# Patient Record
Sex: MALE | Race: BLACK OR AFRICAN AMERICAN | Employment: FULL TIME | URBAN - METROPOLITAN AREA
[De-identification: names, ages, dates, MRNs, and addresses within clinical notes are randomized per-mention and may not be internally consistent; named-entity substitution may affect disease eponyms.]

---

## 2017-10-20 ENCOUNTER — OFFICE VISIT (OUTPATIENT)
Dept: SURGERY | Age: 57
End: 2017-10-20

## 2017-10-20 VITALS
BODY MASS INDEX: 26.05 KG/M2 | HEART RATE: 58 BPM | WEIGHT: 182 LBS | OXYGEN SATURATION: 98 % | SYSTOLIC BLOOD PRESSURE: 125 MMHG | TEMPERATURE: 98 F | DIASTOLIC BLOOD PRESSURE: 72 MMHG | HEIGHT: 70 IN | RESPIRATION RATE: 14 BRPM

## 2017-10-20 DIAGNOSIS — L72.3 SEBACEOUS CYST: ICD-10-CM

## 2017-10-20 NOTE — PROGRESS NOTES
Surgery History and Physical    Subjective:      Avery Wheat is a 61 y.o. black male who presents for evaluation of cysts. Mr. Kandice Fan has had cysts on his back for years. He has busted one, and it has disappeared. He now has a new one. He denies any pain, redness, or drainage. He has not had any previous excisions. He has no personal or family h/o malignant soft tissue tumors. He denies any other lumps or unusual weight loss. No past medical history on file. No past surgical history on file. Family History   Problem Relation Age of Onset    Diabetes Brother     Hypertension Brother      Social History   Substance Use Topics    Smoking status: Never Smoker    Smokeless tobacco: Never Used    Alcohol use Not on file      Prior to Admission medications    Not on File      Not on File    Review of Systems:  A comprehensive review of systems was negative except for that written in the History of Present Illness. Objective:      Physical Exam:  GENERAL: alert, cooperative, no distress, appears stated age, EYE: negative findings: anicteric sclera, LYMPHATIC: Cervical, supraclavicular nodes normal. , THROAT & NECK: neck supple and symmetrical.  The thyroid is grossly normal., LUNG: clear to auscultation bilaterally, HEART: regular rate and rhythm, ABDOMEN: Soft, NT, ND., EXTREMITIES:  no edema, SKIN: Along the upper back, there is an approximately 2 x 2 cm raised, well-circumscribed, soft, mobile, NT cystic mass. Along the left mid back, there is an approximately 4 x 3 cm raised, firm, mobile, well-circumscribed, NT, cystic mass. There is no erythema or drainage at either site., NEUROLOGIC: negative, PSYCHIATRIC: non focal    Assessment:     Sebaceous cysts of the left upper and left mid back. Plan:     Mr. Kandice Fan would like to have the cysts removed as soon as possible.   I discussed the risks of the procedure including bleeding, infection, wound healing problems, recurrence of the cysts, seroma, and reaction to the prep or local anesthetic. He understands the risks; any and all questions were answered to his satisfaction. Mr. Grace Giron will be scheduled for an elective outpatient excision of these 2 cysts of the back under MAC with local anesthesia.     Signed By: Stephani Boyer MD     October 20, 2017

## 2017-10-20 NOTE — PROGRESS NOTES
1. Have you been to the ER, urgent care clinic since your last visit? Hospitalized since your last visit? [facility]    2. Have you seen or consulted any other health care providers outside of the [address] [place] [address] since your last visit? Include any pap smears or colon screening.  no

## 2017-10-27 ENCOUNTER — ANESTHESIA EVENT (OUTPATIENT)
Dept: SURGERY | Age: 57
End: 2017-10-27
Payer: COMMERCIAL

## 2017-10-30 ENCOUNTER — ANESTHESIA (OUTPATIENT)
Dept: SURGERY | Age: 57
End: 2017-10-30
Payer: COMMERCIAL

## 2017-10-30 ENCOUNTER — HOSPITAL ENCOUNTER (OUTPATIENT)
Age: 57
Setting detail: OUTPATIENT SURGERY
Discharge: HOME | End: 2017-10-30
Attending: SURGERY | Admitting: SURGERY
Payer: COMMERCIAL

## 2017-10-30 VITALS
SYSTOLIC BLOOD PRESSURE: 139 MMHG | WEIGHT: 183.2 LBS | DIASTOLIC BLOOD PRESSURE: 81 MMHG | HEIGHT: 70 IN | RESPIRATION RATE: 14 BRPM | HEART RATE: 67 BPM | OXYGEN SATURATION: 100 % | TEMPERATURE: 97.8 F | BODY MASS INDEX: 26.23 KG/M2

## 2017-10-30 DIAGNOSIS — L72.3 SEBACEOUS CYST: ICD-10-CM

## 2017-10-30 LAB
ATRIAL RATE: 56 BPM
CALCULATED P AXIS, ECG09: 54 DEGREES
CALCULATED R AXIS, ECG10: 11 DEGREES
CALCULATED T AXIS, ECG11: 4 DEGREES
DIAGNOSIS, 93000: NORMAL
P-R INTERVAL, ECG05: 160 MS
Q-T INTERVAL, ECG07: 412 MS
QRS DURATION, ECG06: 82 MS
QTC CALCULATION (BEZET), ECG08: 397 MS
VENTRICULAR RATE, ECG03: 56 BPM

## 2017-10-30 PROCEDURE — 74011250636 HC RX REV CODE- 250/636

## 2017-10-30 PROCEDURE — 77030031139 HC SUT VCRL2 J&J -A: Performed by: SURGERY

## 2017-10-30 PROCEDURE — 74011000250 HC RX REV CODE- 250: Performed by: SURGERY

## 2017-10-30 PROCEDURE — 77030018836 HC SOL IRR NACL ICUM -A: Performed by: SURGERY

## 2017-10-30 PROCEDURE — 77030032490 HC SLV COMPR SCD KNE COVD -B: Performed by: SURGERY

## 2017-10-30 PROCEDURE — 88304 TISSUE EXAM BY PATHOLOGIST: CPT | Performed by: SURGERY

## 2017-10-30 PROCEDURE — 93005 ELECTROCARDIOGRAM TRACING: CPT

## 2017-10-30 PROCEDURE — 76060000032 HC ANESTHESIA 0.5 TO 1 HR: Performed by: SURGERY

## 2017-10-30 PROCEDURE — 77030020782 HC GWN BAIR PAWS FLX 3M -B

## 2017-10-30 PROCEDURE — 77030002916 HC SUT ETHLN J&J -A: Performed by: SURGERY

## 2017-10-30 PROCEDURE — 77030002933 HC SUT MCRYL J&J -A: Performed by: SURGERY

## 2017-10-30 PROCEDURE — 76210000022 HC REC RM PH II 1.5 TO 2 HR: Performed by: SURGERY

## 2017-10-30 PROCEDURE — 76010000138 HC OR TIME 0.5 TO 1 HR: Performed by: SURGERY

## 2017-10-30 PROCEDURE — 74011250636 HC RX REV CODE- 250/636: Performed by: ANESTHESIOLOGY

## 2017-10-30 RX ORDER — SODIUM CHLORIDE 9 MG/ML
50 INJECTION, SOLUTION INTRAVENOUS CONTINUOUS
Status: DISCONTINUED | OUTPATIENT
Start: 2017-10-30 | End: 2017-10-30 | Stop reason: HOSPADM

## 2017-10-30 RX ORDER — LIDOCAINE HYDROCHLORIDE 10 MG/ML
0.1 INJECTION, SOLUTION EPIDURAL; INFILTRATION; INTRACAUDAL; PERINEURAL AS NEEDED
Status: DISCONTINUED | OUTPATIENT
Start: 2017-10-30 | End: 2017-10-30 | Stop reason: HOSPADM

## 2017-10-30 RX ORDER — SODIUM CHLORIDE 0.9 % (FLUSH) 0.9 %
5-10 SYRINGE (ML) INJECTION AS NEEDED
Status: DISCONTINUED | OUTPATIENT
Start: 2017-10-30 | End: 2017-10-30 | Stop reason: HOSPADM

## 2017-10-30 RX ORDER — SODIUM CHLORIDE, SODIUM LACTATE, POTASSIUM CHLORIDE, CALCIUM CHLORIDE 600; 310; 30; 20 MG/100ML; MG/100ML; MG/100ML; MG/100ML
50 INJECTION, SOLUTION INTRAVENOUS CONTINUOUS
Status: DISCONTINUED | OUTPATIENT
Start: 2017-10-30 | End: 2017-10-30 | Stop reason: HOSPADM

## 2017-10-30 RX ORDER — SODIUM CHLORIDE 0.9 % (FLUSH) 0.9 %
5-10 SYRINGE (ML) INJECTION EVERY 8 HOURS
Status: DISCONTINUED | OUTPATIENT
Start: 2017-10-30 | End: 2017-10-30 | Stop reason: HOSPADM

## 2017-10-30 RX ORDER — SODIUM CHLORIDE, SODIUM LACTATE, POTASSIUM CHLORIDE, CALCIUM CHLORIDE 600; 310; 30; 20 MG/100ML; MG/100ML; MG/100ML; MG/100ML
100 INJECTION, SOLUTION INTRAVENOUS CONTINUOUS
Status: DISCONTINUED | OUTPATIENT
Start: 2017-10-30 | End: 2017-10-30 | Stop reason: HOSPADM

## 2017-10-30 RX ORDER — HYDROMORPHONE HYDROCHLORIDE 1 MG/ML
0.5 INJECTION, SOLUTION INTRAMUSCULAR; INTRAVENOUS; SUBCUTANEOUS
Status: DISCONTINUED | OUTPATIENT
Start: 2017-10-30 | End: 2017-10-30 | Stop reason: HOSPADM

## 2017-10-30 RX ORDER — MIDAZOLAM HYDROCHLORIDE 1 MG/ML
INJECTION, SOLUTION INTRAMUSCULAR; INTRAVENOUS AS NEEDED
Status: DISCONTINUED | OUTPATIENT
Start: 2017-10-30 | End: 2017-10-30 | Stop reason: HOSPADM

## 2017-10-30 RX ORDER — FENTANYL CITRATE 50 UG/ML
25 INJECTION, SOLUTION INTRAMUSCULAR; INTRAVENOUS
Status: DISCONTINUED | OUTPATIENT
Start: 2017-10-30 | End: 2017-10-30 | Stop reason: HOSPADM

## 2017-10-30 RX ORDER — BACITRACIN ZINC 500 UNIT/G
OINTMENT (GRAM) TOPICAL AS NEEDED
Status: DISCONTINUED | OUTPATIENT
Start: 2017-10-30 | End: 2017-10-30 | Stop reason: HOSPADM

## 2017-10-30 RX ORDER — KETOROLAC TROMETHAMINE 30 MG/ML
INJECTION, SOLUTION INTRAMUSCULAR; INTRAVENOUS AS NEEDED
Status: DISCONTINUED | OUTPATIENT
Start: 2017-10-30 | End: 2017-10-30 | Stop reason: HOSPADM

## 2017-10-30 RX ORDER — FENTANYL CITRATE 50 UG/ML
INJECTION, SOLUTION INTRAMUSCULAR; INTRAVENOUS AS NEEDED
Status: DISCONTINUED | OUTPATIENT
Start: 2017-10-30 | End: 2017-10-30 | Stop reason: HOSPADM

## 2017-10-30 RX ORDER — HYDROMORPHONE HYDROCHLORIDE 1 MG/ML
.25-1 INJECTION, SOLUTION INTRAMUSCULAR; INTRAVENOUS; SUBCUTANEOUS
Status: DISCONTINUED | OUTPATIENT
Start: 2017-10-30 | End: 2017-10-30 | Stop reason: HOSPADM

## 2017-10-30 RX ORDER — OXYCODONE AND ACETAMINOPHEN 5; 325 MG/1; MG/1
1 TABLET ORAL
Qty: 10 TAB | Refills: 0 | Status: SHIPPED | OUTPATIENT
Start: 2017-10-30

## 2017-10-30 RX ADMIN — MIDAZOLAM HYDROCHLORIDE 1 MG: 1 INJECTION, SOLUTION INTRAMUSCULAR; INTRAVENOUS at 13:35

## 2017-10-30 RX ADMIN — MIDAZOLAM HYDROCHLORIDE 0.5 MG: 1 INJECTION, SOLUTION INTRAMUSCULAR; INTRAVENOUS at 13:29

## 2017-10-30 RX ADMIN — FENTANYL CITRATE 25 MCG: 50 INJECTION, SOLUTION INTRAMUSCULAR; INTRAVENOUS at 13:25

## 2017-10-30 RX ADMIN — MIDAZOLAM HYDROCHLORIDE 1 MG: 1 INJECTION, SOLUTION INTRAMUSCULAR; INTRAVENOUS at 13:07

## 2017-10-30 RX ADMIN — SODIUM CHLORIDE, POTASSIUM CHLORIDE, SODIUM LACTATE AND CALCIUM CHLORIDE 100 ML/HR: 600; 310; 30; 20 INJECTION, SOLUTION INTRAVENOUS at 12:44

## 2017-10-30 RX ADMIN — MIDAZOLAM HYDROCHLORIDE 1 MG: 1 INJECTION, SOLUTION INTRAMUSCULAR; INTRAVENOUS at 13:11

## 2017-10-30 RX ADMIN — MIDAZOLAM HYDROCHLORIDE 1 MG: 1 INJECTION, SOLUTION INTRAMUSCULAR; INTRAVENOUS at 13:16

## 2017-10-30 RX ADMIN — FENTANYL CITRATE 50 MCG: 50 INJECTION, SOLUTION INTRAMUSCULAR; INTRAVENOUS at 13:16

## 2017-10-30 RX ADMIN — MIDAZOLAM HYDROCHLORIDE 0.5 MG: 1 INJECTION, SOLUTION INTRAMUSCULAR; INTRAVENOUS at 13:25

## 2017-10-30 RX ADMIN — FENTANYL CITRATE 25 MCG: 50 INJECTION, SOLUTION INTRAMUSCULAR; INTRAVENOUS at 13:31

## 2017-10-30 RX ADMIN — KETOROLAC TROMETHAMINE 30 MG: 30 INJECTION, SOLUTION INTRAMUSCULAR; INTRAVENOUS at 13:43

## 2017-10-30 NOTE — ANESTHESIA POSTPROCEDURE EVALUATION
Post-Anesthesia Evaluation and Assessment    Patient: Perla Maynard MRN: [id number]  SSN: [id number]    YOB: 1960  Age: 61 y.o. Sex: male       Cardiovascular Function/Vital Signs  Visit Vitals    /81 (BP 1 Location: Left arm, BP Patient Position: At rest)    Pulse 67    Temp 36.6 °C (97.8 °F)    Resp 14    Ht 5' 10\" (1.778 m)    Wt 83.1 kg (183 lb 3.2 oz)    SpO2 100%    BMI 26.29 kg/m2       Patient is status post MAC anesthesia for Procedure(s):  EXCISION OF CYSTS OF LEFT UPPER & LEFT MID BACK . Nausea/Vomiting: None    Postoperative hydration reviewed and adequate. Pain:  Pain Scale 1: Numeric (0 - 10) (10/30/17 1431)  Pain Intensity 1: 0 (10/30/17 1431)   Managed    Neurological Status:   Neuro (WDL): Within Defined Limits (10/30/17 1431)  Neuro  Neurologic State: Alert; Appropriate for age (10/30/17 1431)  Orientation Level: Oriented X4 (10/30/17 1431)  Speech: Clear (10/30/17 1431)  LUE Motor Response: Purposeful;Spontaneous  (10/30/17 1431)  LLE Motor Response: Spontaneous ; Purposeful (10/30/17 1431)  RUE Motor Response: Purposeful;Spontaneous  (10/30/17 1431)  RLE Motor Response: Spontaneous ; Purposeful (10/30/17 1431)   At baseline    Mental Status and Level of Consciousness: Arousable    Pulmonary Status:   O2 Device: Room air (10/30/17 1431)   Adequate oxygenation and airway patent    Complications related to anesthesia: None    Post-anesthesia assessment completed.  No concerns    Signed By: Abelardo Lima MD     October 30, 2017

## 2017-10-30 NOTE — IP AVS SNAPSHOT
Summary of Care Report The Summary of Care report has been created to help improve care coordination. Users with access to [employer] or [address] (Web-based application) may access additional patient information including the Discharge Summary. If you are not currently a [address] user and need more information, please call the number listed below in the [place] [address] section and ask to be connected with Medical Records. Facility Information Name Address Phone [address] [place] [id number] [phone] Patient Information Patient Name Sex   ([id number]) Male 1960 Discharge Information Admitting Provider Service Area Unit Jaydon Butler MD / [phone] [facility] 645 / [phone] Discharge Provider Discharge Date/Time Discharge Disposition Destination (none) 10/30/2017 (Pending) [facility] (none) Patient Language Language ENGLISH [13] Hospital Problems as of 10/30/2017  Reviewed: 10/20/2017 10:04 AM by Jaydon Butler MD  
 None Non-Hospital Problems as of 10/30/2017  Reviewed: 10/20/2017 10:04 AM by Jaydon Butler MD  
  
  
  
 Class Noted - Resolved Last Modified Active Problems Sebaceous cyst  10/20/2017 - Present 10/20/2017 by Jaydon Butler MD  
  Entered by Jaydon Butler MD  
  Overview Signed 10/20/2017 10:04 AM by Jaydon Butler MD  
   Left upper and left mid back. You are allergic to the following No active allergies Current Discharge Medication List  
  
START taking these medications Dose & Instructions Dispensing Information Comments  
 oxyCODONE-acetaminophen 5-325 mg per tablet Commonly known as:  PERCOCET Dose:  1 Tab Take 1 Tab by mouth every four (4) hours as needed for Pain. Max Daily Amount: 6 Tabs. Quantity:  10 Tab Refills:  0 Surgery Information ID Date/Time Status Primary Surgeon All Procedures Location [id number] 10/30/2017 2520 Malissa Becerra MD EXCISION OF CYSTS OF LEFT UPPER & LEFT MID BACK  [facility]IN OR Follow-up Information Follow up With Details Comments Contact Info None   None (395) Patient stated that they have no PCP Discharge Instructions Instructions Following Excision of Cyst, Soft Tissue Mass Activity: · No quick, stretching movement with your back for 2 weeks. · May shower tomorrow. No bath for 2 weeks and until after seen by your doctor. Diet: 
· Advance to regular diet as tolerated. · If nausea and vomiting continues, call your doctor. Pain: · Take pain medication as directed by your doctor. ·  Call your doctor if pain is NOT relieved by medication. · DO NOT take blood thinners until directed by your doctor. Dressing Care:  
Remove your bandage on Wednesday, 11/1. Apply a topical antibiotic ointment daily after cleaning the incisions with a damp wash cloth, then cover the incisions with a dry bandage. Keep the incisions clean and dry. Follow-Up Phone Calls: 
· Call will be made by my nursing staff tomorrow. · If you have any problems or concerns, call your doctor as needed. Call your doctor if you experience: 
· Excessive bleeding that does not stop after holding mild pressure over the area. · Temperature of 101° Fahrenheit or above. · Redness, excessive swelling or bruising, and/or green or yellow, smelly discharge from incision. After Anesthesia: 
· For the first 24 hours and while taking narcotic pain medication: DO NOT drive, drink alcoholic beverages, or make important decisions. · Be aware of dizziness following anesthesia and while taking pain medication. Rere Romero. Kimberly Ernandez MD, FACS General Surgery at [facility] [address], [address] 
[phone] Fax [phone] DISCHARGE SUMMARY from your Nurse The following personal items collected during your admission are returned to you:  
Dental Appliance: Dental Appliances: None Vision: Visual Aid: None Hearing Aid:   
Jewelry: Jewelry: None Clothing: Clothing: Shirt, Belt, Socks, Footwear, Undergarments, Pants, Jacket/Coat Other Valuables: Other Valuables: FIZZA, Cell Phone (given to security ) Valuables sent to safe:   
 
PATIENT INSTRUCTIONS: 
 
After general anesthesia or intravenous sedation, for 24 hours or while taking prescription Narcotics: · Limit your activities · Do not drive and operate hazardous machinery · Do not make important personal or business decisions · Do  not drink alcoholic beverages · If you have not urinated within 8 hours after discharge, please contact your surgeon on call. Report the following to your surgeon: 
· Excessive pain, swelling, redness or odor of or around the surgical area · Temperature over 100.5 · Nausea and vomiting lasting longer than 4 hours or if unable to take medications · Any signs of decreased circulation or nerve impairment to extremity: change in color, persistent  numbness, tingling, coldness or increase pain · Any questions [address] Breathing deep and coughing are very important exercises to do after surgery. Deep breathing and coughing open the little air tubes and air sacks in your lungs. You take deep breaths every day. You may not even notice - it is just something you do when you sigh or yawn. It is a natural exercise you do to keep these air passages open. After surgery, take deep breaths and cough, on purpose.    
 
Coughing and deep breathing help prevent bronchitis and pneumonia after surgery. If you had chest or belly surgery, use a pillow as a \"hug shan\" and hold it tightly to your chest or belly when you cough. DIRECTIONS: 
6. Take 10 to 15 slow deep breaths every hour while awake. 7. Breathe in deeply, and hold it for 2 seconds. 8. Exhale slowly through puckered lips, like blowing up a balloon. 9. After every 4th or 5th deep breath, hug your pillow to your chest or belly and give a hard, deep cough. Yes, it will probably hurt. But doing this exercise is very important part of healing after surgery. Take your pain medicine to help you do this exercise without too much pain. IF YOU HAVE BEEN DIAGNOSED WITH SLEEP APNEA, PLEASE USE YOUR SLEEP APNEA DEVICE OR CPAP MACHINE WHEN YOU INTEND TO NAP AFTER TAKING PAIN MEDICATION. Ankle Pumps Ankle pumps increase the circulation of oxygenated blood to your lower extremities and decrease your risk for circulation problems such as blood clots. They also stretch the muscles, tendons and ligaments in your foot and ankle, and prevent joint contracture in the ankle and foot, especially after surgeries on the legs. It is important to do ankle pump exercises regularly after surgery because immobility increases your risk for developing a blood clot. Your doctor may also have you take an Aspirin for the next few days as well. If your doctor did not ask you to take an Aspirin, consult with him before starting Aspirin therapy on your own. Slowly point your foot forward, feeling the muscles on the top of your lower leg stretch, and hold this position for 5 seconds. Next, pull your foot back toward you as far as possible, stretching the calf muscles, and hold that position for 5 seconds. Repeat with the other foot. Perform 10 repetitions every hour while awake for both ankles if possible (down and then up with the foot once is one repetition). You should feel gentle stretching of the muscles in your lower leg when doing this exercise. If you feel pain, or your range of motion is limited, don't  Push too hard. Only go the limit your joint and muscles will let you go. If you have increasing pain, progressively worsening leg warmth or swelling, STOP the exercise and call your doctor. Below is information about the medications your doctor is prescribing after your visit: 
 
 
· Oxycodone-Acetaminophen 5-325mg Chart Review Routing History No Routing History on File

## 2017-10-30 NOTE — IP AVS SNAPSHOT
Julissa Alvares 
 
 
 [address] [place] [address] 
[phone] Patient: Ruel Angel MRN: [id number] WZA:[birth year]6 About your hospitalization You were admitted on:  October 30, 2017 You last received care in the:  [facility] PACU You were discharged on:  October 30, 2017 Why you were hospitalized Your primary diagnosis was:  Not on File Things You Need To Do (next 8 weeks) Follow up with None Where:  None (395) Patient stated that they have no PCP Monday Nov 13, 2017 POST OP with Larene Kayser., MD at  9:15 AM  
Where: [facility] ([facility]) Discharge Orders None A check eusebio indicates which time of day the medication should be taken. My Medications TAKE these medications as instructed Instructions Each Dose to Equal  
 Morning Noon Evening Bedtime  
 oxyCODONE-acetaminophen 5-325 mg per tablet Commonly known as:  PERCOCET Your last dose was: Your next dose is: Take 1 Tab by mouth every four (4) hours as needed for Pain. Max Daily Amount: 6 Tabs. 1 Tab Where to Get Your Medications Information on where to get these meds will be given to you by the nurse or doctor. ! Ask your nurse or doctor about these medications  
  oxyCODONE-acetaminophen 5-325 mg per tablet Discharge Instructions Instructions Following Excision of Cyst, Soft Tissue Mass Activity: · No quick, stretching movement with your back for 2 weeks. · May shower tomorrow. No bath for 2 weeks and until after seen by your doctor. Diet: 
· Advance to regular diet as tolerated. · If nausea and vomiting continues, call your doctor. Pain: · Take pain medication as directed by your doctor. ·  Call your doctor if pain is NOT relieved by medication. · DO NOT take blood thinners until directed by your doctor. Dressing Care:  
Remove your bandage on Wednesday, 11/1. Apply a topical antibiotic ointment daily after cleaning the incisions with a damp wash cloth, then cover the incisions with a dry bandage. Keep the incisions clean and dry. Follow-Up Phone Calls: 
· Call will be made by my nursing staff tomorrow. · If you have any problems or concerns, call your doctor as needed. Call your doctor if you experience: 
· Excessive bleeding that does not stop after holding mild pressure over the area. · Temperature of 101° Fahrenheit or above. · Redness, excessive swelling or bruising, and/or green or yellow, smelly discharge from incision. After Anesthesia: 
· For the first 24 hours and while taking narcotic pain medication: DO NOT drive, drink alcoholic beverages, or make important decisions. · Be aware of dizziness following anesthesia and while taking pain medication. Yared Myles. Ernst Rogel MD, FACS General Surgery at [address], [address] [facility] 
[phone] Fax [phone] DISCHARGE SUMMARY from your Nurse The following personal items collected during your admission are returned to you:  
Dental Appliance: Dental Appliances: None Vision: Visual Aid: None Hearing Aid:   
Jewelry: Jewelry: None Clothing: Clothing: Shirt, Belt, Socks, Footwear, Undergarments, Pants, Jacket/Coat Other Valuables: Other Valuables: Randy Gaona, Cell Phone (given to security ) Valuables sent to safe:   
 
PATIENT INSTRUCTIONS: 
 
After general anesthesia or intravenous sedation, for 24 hours or while taking prescription Narcotics: · Limit your activities · Do not drive and operate hazardous machinery · Do not make important personal or business decisions · Do  not drink alcoholic beverages · If you have not urinated within 8 hours after discharge, please contact your surgeon on call. Report the following to your surgeon: 
· Excessive pain, swelling, redness or odor of or around the surgical area · Temperature over 100.5 · Nausea and vomiting lasting longer than 4 hours or if unable to take medications · Any signs of decreased circulation or nerve impairment to extremity: change in color, persistent  numbness, tingling, coldness or increase pain · Any questions [address] Breathing deep and coughing are very important exercises to do after surgery. Deep breathing and coughing open the little air tubes and air sacks in your lungs. You take deep breaths every day. You may not even notice - it is just something you do when you sigh or yawn. It is a natural exercise you do to keep these air passages open. After surgery, take deep breaths and cough, on purpose. Coughing and deep breathing help prevent bronchitis and pneumonia after surgery. If you had chest or belly surgery, use a pillow as a \"hug buddy\" and hold it tightly to your chest or belly when you cough. DIRECTIONS: 
6. Take 10 to 15 slow deep breaths every hour while awake. 7. Breathe in deeply, and hold it for 2 seconds. 8. Exhale slowly through puckered lips, like blowing up a balloon. 9. After every 4th or 5th deep breath, hug your pillow to your chest or belly and give a hard, deep cough. Yes, it will probably hurt. But doing this exercise is very important part of healing after surgery. Take your pain medicine to help you do this exercise without too much pain. IF YOU HAVE BEEN DIAGNOSED WITH SLEEP APNEA, PLEASE USE YOUR SLEEP APNEA DEVICE OR CPAP MACHINE WHEN YOU INTEND TO NAP AFTER TAKING PAIN MEDICATION. Ankle Pumps Ankle pumps increase the circulation of oxygenated blood to your lower extremities and decrease your risk for circulation problems such as blood clots.  They also stretch the muscles, tendons and ligaments in your foot and ankle, and prevent joint contracture in the ankle and foot, especially after surgeries on the legs. It is important to do ankle pump exercises regularly after surgery because immobility increases your risk for developing a blood clot. Your doctor may also have you take an Aspirin for the next few days as well. If your doctor did not ask you to take an Aspirin, consult with him before starting Aspirin therapy on your own. Slowly point your foot forward, feeling the muscles on the top of your lower leg stretch, and hold this position for 5 seconds. Next, pull your foot back toward you as far as possible, stretching the calf muscles, and hold that position for 5 seconds. Repeat with the other foot. Perform 10 repetitions every hour while awake for both ankles if possible (down and then up with the foot once is one repetition). You should feel gentle stretching of the muscles in your lower leg when doing this exercise. If you feel pain, or your range of motion is limited, don't  Push too hard. Only go the limit your joint and muscles will let you go. If you have increasing pain, progressively worsening leg warmth or swelling, STOP the exercise and call your doctor. Below is information about the medications your doctor is prescribing after your visit: 
 
 
· Oxycodone-Acetaminophen 5-325mg Introducing [facility] SERVICES! Julia Marco A introduces [employer] patient portal. Now you can access parts of your medical record, email your doctor's office, and request medication refills online. 1. In your internet browser, go to [email or web address] 2. Click on the First Time User? Click Here link in the Sign In box. You will see the New Member Sign Up page. 3. Enter your [employer] Access Code exactly as it appears below. You will not need to use this code after youve completed the sign-up process. If you do not sign up before the expiration date, you must request a new code. · [employer] Access Code: [email or web address] Expires: 1/28/2018 12:06 PM 
 
4. Enter the last four digits of your Social Security Number (xxxx) and Date of Birth (mm/dd/yyyy) as indicated and click Submit. You will be taken to the next sign-up page. 5. Create a [employer] ID. This will be your [employer] login ID and cannot be changed, so think of one that is secure and easy to remember. 6. Create a [employer] password. You can change your password at any time. 7. Enter your Password Reset Question and Answer. This can be used at a later time if you forget your password. 8. Enter your e-mail address. You will receive e-mail notification when new information is available in [address] 9. Click Sign Up. You can now view and download portions of your medical record. 10. Click the Download Summary menu link to download a portable copy of your medical information. If you have questions, please visit the Frequently Asked Questions section of the saperatect website. Remember, [employer] is NOT to be used for urgent needs. For medical emergencies, dial 911. Now available from your iPhone and Android! Providers Seen During Your Hospitalization Provider Specialty Primary office phone Fredi Washington MD Surgery [phone] Your Primary Care Physician (PCP) Primary Care Physician Office Phone Office Fax NONE ** None ** ** None ** You are allergic to the following No active allergies Recent Documentation Height Weight BMI Smoking Status 1.778 m 83.1 kg 26.29 kg/m2 Never Smoker Emergency Contacts Name Discharge Info Relation Home Work Mobile EdvinAzul mcwilliams DISCHARGE CAREGIVER [3] Spouse [3]   [phone] Patient Belongings The following personal items are in your possession at time of discharge: 
  Dental Appliances: None  Visual Aid: None   Hearing Aids/Status: Does not own  Home Medications: None   Jewelry: None  Clothing: Shirt, Belt, Socks, Footwear, Undergarments, Pants, Jacket/Coat    Other Valuables: Wallet, [employer] (given to security ) Please provide this summary of care documentation to your next provider. Signatures-by signing, you are acknowledging that this After Visit Summary has been reviewed with you and you have received a copy. Patient Signature:  ____________________________________________________________ Date:  ____________________________________________________________  
  
Gomez Wheat Provider Signature:  ____________________________________________________________ Date:  ____________________________________________________________

## 2017-10-30 NOTE — OP NOTES
Operative Report        Sofi Ramirez    MRN:  [id number]    Date of Procedure:  10/30/2017    Surgeon:  Bryan Porter MD.     Assistant:   Serina Luevano. Anesthesia:   1. MAC.  2. 1% Xylocaine with Epinephrine and 0.5% Marcaine. Preoperative Diagnosis:   1. CYST OF THE LEFT UPPER BACK . 2. CYST OF THE LEFT MID BACK. Postoperative Diagnosis:    1. CYST OF THE LEFT UPPER BACK . 2. CYST OF THE LEFT MID BACK. Procedures:  1. Excision of cyst of the left upper back. 2. Excision of cyst of the left mid back. Indication:   Sofi Ramirez is a 61 yrs black male who presents with cysts of the back which have been present for years. The cysts are now enlarging and have become inflamed. He would like to have them removed. He now presents for his elective procedure. Procedure in Detail:   The patient was seen preoperatively in the holding area. The risks, benefits, and expected outcome were discussed with the patient, and all questions were answered satisfactorily. The patient concurred with the proposed plan, giving informed consent. The cysts were identified by the patient and confirmed by me. The cysts were then marked. The patient was taken to the OR. The patient was identified as Sofi Ramirez, and the procedure verified as Procedure(s):  EXCISION OF CYSTS OF LEFT UPPER & LEFT MID BACK . The patient was placed on the OR table in the supine position. MAC anesthesia was administered and tolerated well. The patient was flipped to the right lateral decubitus position. The patient's back was prepped with Chlorprep and draped in the usual sterile fashion. A Time Out was performed, and the above information was confirmed. Attention was turned first to the left upper back. Thecyst was palpated and remarked. The local anesthetic was infiltrated into the skin and subcutaneous tissue.   A transverse elliptical incision was made directly over the most prominent portion of the cyst.  Dissection was taken down into the subcutaneous tissue with a #15 blade, and a cyst was discovered. The cyst was dissected out in its entirety with blunt dissection and cautery and passed off as a specimen. No other masses were palpated within the wound. Hemostasis was obtained within the wound as needed with cautery. The wound was irrigated with saline. The subcutaneous tissue was approximated with interrupted 3-0 Vicryl. The skin was closed with 3-0 Nylon in interrupted horizontal mattress fashion. Attention was then turned to the left mid back. Thecyst was palpated and remarked. The local anesthetic was infiltrated into the skin and subcutaneous tissue. A transverse elliptical incision was made directly over the most prominent portion of the cyst.  Dissection was taken down into the subcutaneous tissue with a # 15 blade, and a cyst was discovered. The cyst was dissected out in its entirety with blunt dissection and cautery and passed off as a specimen. No other masses were palpated within the wound. Hemostasis was obtained within the wound as needed with cautery. The wound was irrigated with saline. The subcutaneous tissue was approximated with interrupted 3-0 Vicryl. The skin was closed with 3-0 Nylon in interrupted horizontal mattress fashion. Both wounds were cleaned and dried, and a topical antibiotic ointment and a sterile dressing were applied. Estimated Blood Loss:    Less than 25 ml. Specimen:     ID Type Source Tests Collected by Time Destination   1 : left upper back sebaceous cyst Preservative Cyst  Tisha Carlson MD 10/30/2017 1325 Pathology   2 : left mid-back sebaceous cyst Preservative Cyst  Tisha Carlson MD 10/30/2017 1336 Pathology        Drain:   * No implants in log *    Findings:   1. An approximately 2.5 x 2 cm subcutaneous cyst of the left upper back. 2. An approximately 1.5 x 1 cm subcutaneous cyst of the left mid back. Counts:   All sponge, needle, and instrument counts were correct x 2. Complications:  None. Disposition:  The patient was transferred to the recovery room in stable condition, having tolerated the procedure and anesthesia well.                Signed By: Stephani Boyer MD     October 30, 2017        Hugo Corona

## 2017-10-30 NOTE — DISCHARGE INSTRUCTIONS
Instructions Following Excision of Cyst, Soft Tissue Mass    Activity:  · No quick, stretching movement with your back for 2 weeks. · May shower tomorrow. No bath for 2 weeks and until after seen by your doctor. Diet:  · Advance to regular diet as tolerated. · If nausea and vomiting continues, call your doctor. Pain:  · Take pain medication as directed by your doctor. ·  Call your doctor if pain is NOT relieved by medication. · DO NOT take blood thinners until directed by your doctor. Dressing Care:   Remove your bandage on Wednesday, 11/1. Apply a topical antibiotic ointment daily after cleaning the incisions with a damp wash cloth, then cover the incisions with a dry bandage. Keep the incisions clean and dry. Follow-Up Phone Calls:  · Call will be made by my nursing staff tomorrow. · If you have any problems or concerns, call your doctor as needed. Call your doctor if you experience:  · Excessive bleeding that does not stop after holding mild pressure over the area. · Temperature of 101° Fahrenheit or above. · Redness, excessive swelling or bruising, and/or green or yellow, smelly discharge from incision. After Anesthesia:  · For the first 24 hours and while taking narcotic pain medication: DO NOT drive, drink alcoholic beverages, or make important decisions. · Be aware of dizziness following anesthesia and while taking pain medication. Prasanth Bound. Lorie Ann MD, FACS  General Surgery at [address] [place] [address] Jarrett Munozjandra [address]  [phone]  Fax [phone]    DISCHARGE SUMMARY from your Nurse    The following personal items collected during your admission are returned to you:   Dental Appliance: Dental Appliances: None  Vision: Visual Aid: None  Hearing Aid:    Jewelry: Jewelry: None  Clothing: Clothing: Shirt, [employer], Socks, Footwear, Undergarments, Pants, Jacket/Coat  Other Valuables:  Other Valuables: Wallet, Cell Phone (given to security )  Valuables sent to safe:      PATIENT INSTRUCTIONS:    After general anesthesia or intravenous sedation, for 24 hours or while taking prescription Narcotics:  · Limit your activities  · Do not drive and operate hazardous machinery  · Do not make important personal or business decisions  · Do  not drink alcoholic beverages  · If you have not urinated within 8 hours after discharge, please contact your surgeon on call. Report the following to your surgeon:  · Excessive pain, swelling, redness or odor of or around the surgical area  · Temperature over 100.5  · Nausea and vomiting lasting longer than 4 hours or if unable to take medications  · Any signs of decreased circulation or nerve impairment to extremity: change in color, persistent  numbness, tingling, coldness or increase pain  · Any questions    COUGH AND DEEP BREATHE    Breathing deep and coughing are very important exercises to do after surgery. Deep breathing and coughing open the little air tubes and air sacks in your lungs. You take deep breaths every day. You may not even notice - it is just something you do when you sigh or yawn. It is a natural exercise you do to keep these air passages open. After surgery, take deep breaths and cough, on purpose. Coughing and deep breathing help prevent bronchitis and pneumonia after surgery. If you had chest or belly surgery, use a pillow as a \"hug buddy\" and hold it tightly to your chest or belly when you cough. DIRECTIONS:  6. Take 10 to 15 slow deep breaths every hour while awake. 7. Breathe in deeply, and hold it for 2 seconds. 8. Exhale slowly through puckered lips, like blowing up a balloon. 9. After every 4th or 5th deep breath, hug your pillow to your chest or belly and give a hard, deep cough. Yes, it will probably hurt. But doing this exercise is very important part of healing after surgery.   Take your pain medicine to help you do this exercise without too much pain. IF YOU HAVE BEEN DIAGNOSED WITH SLEEP APNEA, PLEASE USE YOUR SLEEP APNEA DEVICE OR CPAP MACHINE WHEN YOU INTEND TO NAP AFTER TAKING PAIN MEDICATION. Ankle Pumps    Ankle pumps increase the circulation of oxygenated blood to your lower extremities and decrease your risk for circulation problems such as blood clots. They also stretch the muscles, tendons and ligaments in your foot and ankle, and prevent joint contracture in the ankle and foot, especially after surgeries on the legs. It is important to do ankle pump exercises regularly after surgery because immobility increases your risk for developing a blood clot. Your doctor may also have you take an Aspirin for the next few days as well. If your doctor did not ask you to take an Aspirin, consult with him before starting Aspirin therapy on your own. Slowly point your foot forward, feeling the muscles on the top of your lower leg stretch, and hold this position for 5 seconds. Next, pull your foot back toward you as far as possible, stretching the calf muscles, and hold that position for 5 seconds. Repeat with the other foot. Perform 10 repetitions every hour while awake for both ankles if possible (down and then up with the foot once is one repetition). You should feel gentle stretching of the muscles in your lower leg when doing this exercise. If you feel pain, or your range of motion is limited, don't  Push too hard. Only go the limit your joint and muscles will let you go. If you have increasing pain, progressively worsening leg warmth or swelling, STOP the exercise and call your doctor.      Below is information about the medications your doctor is prescribing after your visit:    Other information in your discharge envelope:  []     PRESCRIPTIONS  []     PHYSICAL THERAPY PRESCRIPTION  []     APPOINTMENT CARDS  []     Regional Anesthesia Pamphlet for block or block with On-Q Catheter from Anesthesia Service  []     Medical device information sheets/pamphlets from their    []     School/work excuse note. []     /parent work excuse note. These are general instructions for a healthy lifestyle:    *  Please give a list of your current medications to your Primary Care Provider. *  Please update this list whenever your medications are discontinued, doses are      changed, or new medications (including over-the-counter products) are added. *  Please carry medication information at all times in case of emergency situations. About Smoking  No smoking / No tobacco products / Avoid exposure to second hand smoke    Surgeon General's Warning:  Quitting smoking now greatly reduces serious risk to your health. Obesity, smoking, and sedentary lifestyle greatly increases your risk for illness and disease. A healthy diet, regular physical exercise & weight monitoring are important for maintaining a healthy lifestyle. Congestive Heart Failure  You may be retaining fluid if you have a history of heart failure or if you experience any of the following symptoms:  Weight gain of 3 pounds or more overnight or 5 pounds in a week, increased swelling in our hands or feet or shortness of breath while lying flat in bed. Please call your doctor as soon as you notice any of these symptoms; do not wait until your next office visit. Recognize signs and symptoms of STROKE:  F - face looks uneven  A - arms unable to move or move even  S - speech slurred or non-existent  T - time-call 911 as soon as signs and symptoms begin-DO NOT go         Back to bed or wait to see if you get better-TIME IS BRAIN. Warning signs of HEART ATTACK  Call 911 if you have these symptoms    · Chest discomfort. Most heart attacks involve discomfort in the center of the chest that lasts more than a few minutes, or that goes away and comes back.   It can feel like uncomfortable pressure, squeezing, fullness, or pain. · Discomfort in other areas of the upper body. Symptoms can include pain or discomfort in one or both        Arms, the back, neck, jaw, or stomach. ·  Shortness of breath with or without chest discomfort. · Other signs may include breaking out in a cold sweat, nausea, or lightheadedness    Don't wait more than five minutes to call 911 - MINUTES MATTER! Fast action can save your life. Calling 911 is almost always the fastest way to get lifesaving treatment. Emergency Medical Services staff can begin treatment when they arrive - up to an hour sooner than if someone gets to the hospital by car. [facility] MEDICATION AND SIDE EFFECT GUIDE    The [facility] MEDICATION AND SIDE EFFECT GUIDE was provided to the PATIENT AND CARE PROVIDER.   Information provided includes instruction about drug purpose and common side effects for the following medications:    · Oxycodone-Acetaminophen 5-325mg

## 2017-10-30 NOTE — IP AVS SNAPSHOT
[address] 
[phone] Patient: Michael Louis MRN: [id number] SK My Medications TAKE these medications as instructed Instructions Each Dose to Equal  
 Morning Noon Evening Bedtime  
 oxyCODONE-acetaminophen 5-325 mg per tablet Commonly known as:  PERCOCET Your last dose was: Your next dose is: Take 1 Tab by mouth every four (4) hours as needed for Pain. Max Daily Amount: 6 Tabs. 1 Tab Where to Get Your Medications Information on where to get these meds will be given to you by the nurse or doctor. ! Ask your nurse or doctor about these medications  
  oxyCODONE-acetaminophen 5-325 mg per tablet

## 2017-10-30 NOTE — H&P (VIEW-ONLY)
Surgery History and Physical    Subjective:      Gale Fung is a 61 y.o. black male who presents for evaluation of cysts. Mr. Alvaro Abad has had cysts on his back for years. He has busted one, and it has disappeared. He now has a new one. He denies any pain, redness, or drainage. He has not had any previous excisions. He has no personal or family h/o malignant soft tissue tumors. He denies any other lumps or unusual weight loss. No past medical history on file. No past surgical history on file. Family History   Problem Relation Age of Onset    Diabetes Brother     Hypertension Brother      Social History   Substance Use Topics    Smoking status: Never Smoker    Smokeless tobacco: Never Used    Alcohol use Not on file      Prior to Admission medications    Not on File      Not on File    Review of Systems:  A comprehensive review of systems was negative except for that written in the History of Present Illness. Objective:      Physical Exam:  GENERAL: alert, cooperative, no distress, appears stated age, EYE: negative findings: anicteric sclera, LYMPHATIC: Cervical, supraclavicular nodes normal. , THROAT & NECK: neck supple and symmetrical.  The thyroid is grossly normal., LUNG: clear to auscultation bilaterally, HEART: regular rate and rhythm, ABDOMEN: Soft, NT, ND., EXTREMITIES:  no edema, SKIN: Along the upper back, there is an approximately 2 x 2 cm raised, well-circumscribed, soft, mobile, NT cystic mass. Along the left mid back, there is an approximately 4 x 3 cm raised, firm, mobile, well-circumscribed, NT, cystic mass. There is no erythema or drainage at either site., NEUROLOGIC: negative, PSYCHIATRIC: non focal    Assessment:     Sebaceous cysts of the left upper and left mid back. Plan:     Mr. Alvaro Abad would like to have the cysts removed as soon as possible.   I discussed the risks of the procedure including bleeding, infection, wound healing problems, recurrence of the cysts, seroma, and reaction to the prep or local anesthetic. He understands the risks; any and all questions were answered to his satisfaction. Mr. Genevieve Schreiber will be scheduled for an elective outpatient excision of these 2 cysts of the back under MAC with local anesthesia.     Signed By: Tisha Carlson MD     October 20, 2017

## 2017-10-30 NOTE — ANESTHESIA PREPROCEDURE EVALUATION
Anesthetic History   No history of anesthetic complications            Review of Systems / Medical History  Patient summary reviewed and pertinent labs reviewed    Pulmonary  Within defined limits                 Neuro/Psych   Within defined limits           Cardiovascular  Within defined limits                Exercise tolerance: >4 METS     GI/Hepatic/Renal  Within defined limits              Endo/Other  Within defined limits           Other Findings              Physical Exam    Airway  Mallampati: II  TM Distance: 4 - 6 cm  Neck ROM: normal range of motion   Mouth opening: Normal     Cardiovascular    Rhythm: regular  Rate: normal         Dental    Dentition: Caps/crowns, Upper dentition intact and Lower dentition intact     Pulmonary  Breath sounds clear to auscultation               Abdominal  GI exam deferred       Other Findings            Anesthetic Plan    ASA: 1  Anesthesia type: MAC            Anesthetic plan and risks discussed with: Patient

## 2017-10-30 NOTE — INTERVAL H&P NOTE
H&P Update:  Tonio Smith was seen and examined. History and physical has been reviewed. The patient has been examined.  There have been no significant clinical changes since the completion of the originally dated History and Physical.    Signed By: Rosa Garvey MD     October 30, 2017 12:33 PM

## 2017-10-31 ENCOUNTER — TELEPHONE (OUTPATIENT)
Dept: SURGERY | Age: 57
End: 2017-10-31

## 2017-11-21 ENCOUNTER — OFFICE VISIT (OUTPATIENT)
Dept: SURGERY | Age: 57
End: 2017-11-21

## 2017-11-21 VITALS
TEMPERATURE: 98.3 F | DIASTOLIC BLOOD PRESSURE: 78 MMHG | WEIGHT: 183 LBS | HEIGHT: 70 IN | RESPIRATION RATE: 14 BRPM | HEART RATE: 67 BPM | BODY MASS INDEX: 26.2 KG/M2 | OXYGEN SATURATION: 98 % | SYSTOLIC BLOOD PRESSURE: 129 MMHG

## 2017-11-21 DIAGNOSIS — Z48.89 ENCOUNTER FOR POSTOPERATIVE WOUND CHECK: ICD-10-CM

## 2017-11-21 PROBLEM — L72.0 EPIDERMAL INCLUSION CYST: Status: ACTIVE | Noted: 2017-10-20

## 2017-11-21 NOTE — PROGRESS NOTES
1. Have you been to the ER, urgent care clinic since your last visit? Hospitalized since your last visit?no    2. Have you seen or consulted any other health care providers outside of the [address] [place] [address] since your last visit? Include any pap smears or colon screening.  no

## 2020-12-10 ENCOUNTER — APPOINTMENT (OUTPATIENT)
Dept: CT IMAGING | Age: 60
End: 2020-12-10
Attending: EMERGENCY MEDICINE
Payer: COMMERCIAL

## 2020-12-10 ENCOUNTER — APPOINTMENT (OUTPATIENT)
Dept: GENERAL RADIOLOGY | Age: 60
End: 2020-12-10
Attending: EMERGENCY MEDICINE
Payer: COMMERCIAL

## 2020-12-10 ENCOUNTER — HOSPITAL ENCOUNTER (EMERGENCY)
Age: 60
Discharge: HOME | End: 2020-12-10
Attending: EMERGENCY MEDICINE
Payer: COMMERCIAL

## 2020-12-10 VITALS
HEART RATE: 80 BPM | SYSTOLIC BLOOD PRESSURE: 147 MMHG | TEMPERATURE: 99.3 F | BODY MASS INDEX: 26.48 KG/M2 | HEIGHT: 70 IN | DIASTOLIC BLOOD PRESSURE: 75 MMHG | WEIGHT: 185 LBS | OXYGEN SATURATION: 95 % | RESPIRATION RATE: 16 BRPM

## 2020-12-10 DIAGNOSIS — U07.1 COVID-19: Primary | ICD-10-CM

## 2020-12-10 DIAGNOSIS — R06.02 SOB (SHORTNESS OF BREATH): ICD-10-CM

## 2020-12-10 LAB
ABO + RH BLD: NORMAL
ALBUMIN SERPL-MCNC: 2.5 G/DL (ref 3.5–5)
ALBUMIN/GLOB SERPL: 0.5 {RATIO} (ref 1.1–2.2)
ALP SERPL-CCNC: 72 U/L (ref 45–117)
ALT SERPL-CCNC: 76 U/L (ref 12–78)
ANION GAP SERPL CALC-SCNC: 7 MMOL/L (ref 5–15)
APTT PPP: 25.2 SEC (ref 22.1–31)
AST SERPL-CCNC: 67 U/L (ref 15–37)
ATRIAL RATE: 89 BPM
BASOPHILS # BLD: 0 K/UL (ref 0–0.1)
BASOPHILS NFR BLD: 0 % (ref 0–1)
BILIRUB SERPL-MCNC: 0.5 MG/DL (ref 0.2–1)
BLOOD GROUP ANTIBODIES SERPL: NORMAL
BUN SERPL-MCNC: 20 MG/DL (ref 6–20)
BUN/CREAT SERPL: 19 (ref 12–20)
CALCIUM SERPL-MCNC: 8.6 MG/DL (ref 8.5–10.1)
CALCULATED P AXIS, ECG09: 43 DEGREES
CALCULATED T AXIS, ECG11: 11 DEGREES
CHLORIDE SERPL-SCNC: 99 MMOL/L (ref 97–108)
CO2 SERPL-SCNC: 24 MMOL/L (ref 21–32)
COMMENT, HOLDF: NORMAL
CREAT SERPL-MCNC: 1.08 MG/DL (ref 0.7–1.3)
D DIMER PPP FEU-MCNC: 2.01 MG/L FEU (ref 0–0.65)
DIAGNOSIS, 93000: NORMAL
DIFFERENTIAL METHOD BLD: ABNORMAL
EOSINOPHIL # BLD: 0 K/UL (ref 0–0.4)
EOSINOPHIL NFR BLD: 0 % (ref 0–7)
ERYTHROCYTE [DISTWIDTH] IN BLOOD BY AUTOMATED COUNT: 12.2 % (ref 11.5–14.5)
FERRITIN SERPL-MCNC: 1745 NG/ML (ref 26–388)
FIBRINOGEN PPP-MCNC: >800 MG/DL (ref 200–475)
GLOBULIN SER CALC-MCNC: 5.4 G/DL (ref 2–4)
GLUCOSE SERPL-MCNC: 178 MG/DL (ref 65–100)
HCT VFR BLD AUTO: 44.7 % (ref 36.6–50.3)
HGB BLD-MCNC: 14.8 G/DL (ref 12.1–17)
IMM GRANULOCYTES # BLD AUTO: 0 K/UL
IMM GRANULOCYTES NFR BLD AUTO: 0 %
INR PPP: 1.1 (ref 0.9–1.1)
LACTATE SERPL-SCNC: 1.7 MMOL/L (ref 0.4–2)
LIPASE SERPL-CCNC: 77 U/L (ref 73–393)
LYMPHOCYTES # BLD: 0.5 K/UL (ref 0.8–3.5)
LYMPHOCYTES NFR BLD: 6 % (ref 12–49)
MAGNESIUM SERPL-MCNC: 2.3 MG/DL (ref 1.6–2.4)
MCH RBC QN AUTO: 28.2 PG (ref 26–34)
MCHC RBC AUTO-ENTMCNC: 33.1 G/DL (ref 30–36.5)
MCV RBC AUTO: 85.1 FL (ref 80–99)
MONOCYTES # BLD: 1.5 K/UL (ref 0–1)
MONOCYTES NFR BLD: 17 % (ref 5–13)
NEUTS BAND NFR BLD MANUAL: 6 % (ref 0–6)
NEUTS SEG # BLD: 6.7 K/UL (ref 1.8–8)
NEUTS SEG NFR BLD: 71 % (ref 32–75)
NRBC # BLD: 0 K/UL (ref 0–0.01)
NRBC BLD-RTO: 0 PER 100 WBC
P-R INTERVAL, ECG05: 142 MS
PLATELET # BLD AUTO: 319 K/UL (ref 150–400)
PMV BLD AUTO: 9.9 FL (ref 8.9–12.9)
POTASSIUM SERPL-SCNC: 3.6 MMOL/L (ref 3.5–5.1)
PROCALCITONIN SERPL-MCNC: 0.37 NG/ML
PROT SERPL-MCNC: 7.9 G/DL (ref 6.4–8.2)
PROTHROMBIN TIME: 11.5 SEC (ref 9–11.1)
Q-T INTERVAL, ECG07: 360 MS
QRS DURATION, ECG06: 82 MS
QTC CALCULATION (BEZET), ECG08: 438 MS
RBC # BLD AUTO: 5.25 M/UL (ref 4.1–5.7)
RBC MORPH BLD: ABNORMAL
SAMPLES BEING HELD,HOLD: NORMAL
SODIUM SERPL-SCNC: 130 MMOL/L (ref 136–145)
SPECIMEN EXP DATE BLD: NORMAL
THERAPEUTIC RANGE,PTTT: NORMAL SECS (ref 58–77)
TROPONIN I SERPL-MCNC: <0.05 NG/ML
VENTRICULAR RATE, ECG03: 89 BPM
WBC # BLD AUTO: 8.7 K/UL (ref 4.1–11.1)
WBC MORPH BLD: ABNORMAL

## 2020-12-10 PROCEDURE — 85025 COMPLETE CBC W/AUTO DIFF WBC: CPT

## 2020-12-10 PROCEDURE — 85379 FIBRIN DEGRADATION QUANT: CPT

## 2020-12-10 PROCEDURE — 83735 ASSAY OF MAGNESIUM: CPT

## 2020-12-10 PROCEDURE — 74011000636 HC RX REV CODE- 636: Performed by: RADIOLOGY

## 2020-12-10 PROCEDURE — 84145 PROCALCITONIN (PCT): CPT

## 2020-12-10 PROCEDURE — 82728 ASSAY OF FERRITIN: CPT

## 2020-12-10 PROCEDURE — 83690 ASSAY OF LIPASE: CPT

## 2020-12-10 PROCEDURE — 85730 THROMBOPLASTIN TIME PARTIAL: CPT

## 2020-12-10 PROCEDURE — 71045 X-RAY EXAM CHEST 1 VIEW: CPT

## 2020-12-10 PROCEDURE — 85610 PROTHROMBIN TIME: CPT

## 2020-12-10 PROCEDURE — 85384 FIBRINOGEN ACTIVITY: CPT

## 2020-12-10 PROCEDURE — 83605 ASSAY OF LACTIC ACID: CPT

## 2020-12-10 PROCEDURE — 99285 EMERGENCY DEPT VISIT HI MDM: CPT

## 2020-12-10 PROCEDURE — 93005 ELECTROCARDIOGRAM TRACING: CPT

## 2020-12-10 PROCEDURE — 74011250636 HC RX REV CODE- 250/636: Performed by: EMERGENCY MEDICINE

## 2020-12-10 PROCEDURE — 80053 COMPREHEN METABOLIC PANEL: CPT

## 2020-12-10 PROCEDURE — 74011250637 HC RX REV CODE- 250/637: Performed by: EMERGENCY MEDICINE

## 2020-12-10 PROCEDURE — 86900 BLOOD TYPING SEROLOGIC ABO: CPT

## 2020-12-10 PROCEDURE — 84484 ASSAY OF TROPONIN QUANT: CPT

## 2020-12-10 PROCEDURE — 71275 CT ANGIOGRAPHY CHEST: CPT

## 2020-12-10 PROCEDURE — 96360 HYDRATION IV INFUSION INIT: CPT

## 2020-12-10 PROCEDURE — 36415 COLL VENOUS BLD VENIPUNCTURE: CPT

## 2020-12-10 RX ORDER — AZITHROMYCIN 250 MG/1
500 TABLET, FILM COATED ORAL DAILY
Status: DISCONTINUED | OUTPATIENT
Start: 2020-12-10 | End: 2020-12-10 | Stop reason: HOSPADM

## 2020-12-10 RX ORDER — ALBUTEROL SULFATE 90 UG/1
2 AEROSOL, METERED RESPIRATORY (INHALATION)
Qty: 1 INHALER | Refills: 0 | Status: SHIPPED | OUTPATIENT
Start: 2020-12-10

## 2020-12-10 RX ORDER — ACETAMINOPHEN 500 MG
1000 TABLET ORAL ONCE
Status: COMPLETED | OUTPATIENT
Start: 2020-12-10 | End: 2020-12-10

## 2020-12-10 RX ORDER — AZITHROMYCIN 250 MG/1
TABLET, FILM COATED ORAL
Qty: 4 TAB | Refills: 0 | Status: SHIPPED | OUTPATIENT
Start: 2020-12-10 | End: 2020-12-15

## 2020-12-10 RX ORDER — DEXAMETHASONE SODIUM PHOSPHATE 10 MG/ML
10 INJECTION INTRAMUSCULAR; INTRAVENOUS
Status: DISCONTINUED | OUTPATIENT
Start: 2020-12-10 | End: 2020-12-10

## 2020-12-10 RX ADMIN — SODIUM CHLORIDE 1000 ML: 900 INJECTION, SOLUTION INTRAVENOUS at 01:46

## 2020-12-10 RX ADMIN — IOPAMIDOL 80 ML: 755 INJECTION, SOLUTION INTRAVENOUS at 03:40

## 2020-12-10 RX ADMIN — AZITHROMYCIN MONOHYDRATE 500 MG: 250 TABLET ORAL at 03:58

## 2020-12-10 RX ADMIN — ACETAMINOPHEN 1000 MG: 500 TABLET ORAL at 01:46

## 2020-12-10 NOTE — ED TRIAGE NOTES
Triage: pt advises of shortness of breath and diarrhea for past ten days. Pt had positive covid test on 12/1/20.

## 2020-12-10 NOTE — ED PROVIDER NOTES
HPI   74-year-old male presents with shortness of breath and diarrhea. Patient states he began with symptoms of Covid 10 days ago. He was tested 1 week ago and was positive. He has been doing okay at home and been having diarrhea. He presents to ED tonight with concern for hypoxia. He states his oxygen saturations were read reading in the low 90s on his home pulse ox. He denies chest pain, nausea, vomiting. He does have 2 episodes of watery diarrhea daily. He is tolerating p.o. and urinating. He denies any pain or swelling in his legs. He has not taken any medications for symptoms today including Tylenol Motrin. No other complaints. Patient has not on any medications and has no past medical history. No past medical history on file.     Past Surgical History:   Procedure Laterality Date    HX APPENDECTOMY      HX TONSILLECTOMY           Family History:   Problem Relation Age of Onset    Diabetes Brother     Hypertension Brother        Social History     Socioeconomic History    Marital status:      Spouse name: Not on file    Number of children: Not on file    Years of education: Not on file    Highest education level: Not on file   Occupational History    Not on file   Social Needs    Financial resource strain: Not on file    Food insecurity     Worry: Not on file     Inability: Not on file    Transportation needs     Medical: Not on file     Non-medical: Not on file   Tobacco Use    Smoking status: Never Smoker    Smokeless tobacco: Never Used   Substance and Sexual Activity    Alcohol use: Not on file    Drug use: No    Sexual activity: Never   Lifestyle    Physical activity     Days per week: Not on file     Minutes per session: Not on file    Stress: Not on file   Relationships    Social connections     Talks on phone: Not on file     Gets together: Not on file     Attends [employer] service: Not on file     Active member of club or organization: Not on file     Attends meetings of clubs or organizations: Not on file     Relationship status: Not on file    Intimate partner violence     Fear of current or ex partner: Not on file     Emotionally abused: Not on file     Physically abused: Not on file     Forced sexual activity: Not on file   Other Topics Concern    Not on file   Social History Narrative    Not on file         ALLERGIES: Patient has no known allergies. Review of Systems   Constitutional: Positive for fatigue and fever. Negative for appetite change. HENT: Negative for sore throat. Respiratory: Positive for shortness of breath. Cardiovascular: Negative for chest pain and leg swelling. Gastrointestinal: Positive for diarrhea. Negative for abdominal pain, blood in stool, nausea and vomiting. Genitourinary: Negative for decreased urine volume and dysuria. Musculoskeletal: Positive for myalgias. Negative for neck pain and neck stiffness. Skin: Negative for rash. All other systems reviewed and are negative.       Vitals:    12/10/20 0120   BP: (!) 193/91   Pulse: 87   Resp: 16   Temp: (!) 101.6 °F (38.7 °C)   SpO2: 94%   Weight: 83.9 kg (185 lb)   Height: 5' 10\" (1.778 m)            Physical Exam   Physical Examination: General appearance - alert, well appearing, and in no distress, oriented to person, place, and time and normal appearing weight  Eyes - pupils equal and reactive, extraocular eye movements intact  Neck - supple, no significant adenopathy  Chest - coarse breath sounds b/l lung fields, no increased work of breathing or accessory muscle use, oxygen saturations 95 to 96% on room air  Heart - normal rate, regular rhythm, normal S1, S2, no murmurs, rubs, clicks or gallops  Abdomen - soft, nontender, nondistended, no masses or organomegaly  Back exam - full range of motion, no tenderness, palpable spasm or pain on motion  Neurological - alert, oriented, normal speech, no focal findings or movement disorder noted  Musculoskeletal - no joint tenderness, deformity or swelling  Extremities - peripheral pulses normal, no pedal edema, no clubbing or cyanosis  Skin - normal coloration and turgor, no rashes, no suspicious skin lesions noted  MDM  Number of Diagnoses or Management Options  COVID-19:   SOB (shortness of breath):      Amount and/or Complexity of Data Reviewed  Clinical lab tests: ordered and reviewed  Tests in the radiology section of CPT®: ordered and reviewed  Decide to obtain previous medical records or to obtain history from someone other than the patient: yes  Review and summarize past medical records: yes  Independent visualization of images, tracings, or specimens: yes           Procedures    ED EKG interpretation:  Rhythm: normal sinus rhythm; and regular . Rate (approx.): 89; Axis: normal; P wave: normal; QRS interval: normal ; ST/T wave: non-specific changes;EKG documented by Germain Quinones MD    Patient with known Covid infection. Presents febrile. Chest x-ray with findings consistent with Covid. Labs unremarkable. Will give patient IV fluids and reassess. Oxygen saturations mid 90s on room air without increased work of breathing.

## 2020-12-10 NOTE — DISCHARGE INSTRUCTIONS
Patient Education        10 Things to Do When You Have COVID-19    Stay home. Don't go to school, work, or public areas. And don't use public transportation, ride-shares, or taxis unless you have no choice. Leave your home only if you need to get medical care. But call the doctor's office first so they know you're coming. And wear a cloth face cover. Ask before leaving isolation. Talk with your doctor or other health professional about when it will be safe for you to leave isolation. Wear a cloth face cover when you are around other people. It can help stop the spread of the virus when you cough or sneeze. Limit contact with people in your home. If possible, stay in a separate bedroom and use a separate bathroom. Avoid contact with pets and other animals. If possible, have a friend or family member care for them while you're sick. Cover your mouth and nose with a tissue when you cough or sneeze. Then throw the tissue in the trash right away. Wash your hands often, especially after you cough or sneeze. Use soap and water, and scrub for at least 20 seconds. If soap and water aren't available, use an alcohol-based hand . Don't share personal household items. These include bedding, towels, cups and glasses, and eating utensils. Clean and disinfect your home every day. Use household  or disinfectant wipes or sprays. Take special care to clean things that you grab with your hands. These include doorknobs, remote controls, phones, and handles on your refrigerator and microwave. And don't forget countertops, tabletops, bathrooms, and computer keyboards. Take acetaminophen (Tylenol) to relieve fever and body aches. Read and follow all instructions on the label. Current as of: July 10, 2020               Content Version: 12.6  © 2006-2020 [employer].    Care instructions adapted under license by [employer] (which disclaims liability or warranty for this information). If you have questions about a medical condition or this instruction, always ask your healthcare professional. [place] [address] any warranty or liability for your use of this information. Patient Education        Shortness of Breath: Care Instructions  Your Care Instructions     Shortness of breath has many causes. Sometimes conditions such as anxiety can lead to shortness of breath. Some people get mild shortness of breath when they exercise. Trouble breathing also can be a symptom of a serious problem, such as asthma, lung disease, emphysema, heart problems, and pneumonia. If your shortness of breath continues, you may need tests and treatment. Watch for any changes in your breathing and other symptoms. Follow-up care is a key part of your treatment and safety. Be sure to make and go to all appointments, and call your doctor if you are having problems. It's also a good idea to know your test results and keep a list of the medicines you take. How can you care for yourself at home? · Do not smoke or allow others to smoke around you. If you need help quitting, talk to your doctor about stop-smoking programs and medicines. These can increase your chances of quitting for good. · Get plenty of rest and sleep. · Take your medicines exactly as prescribed. Call your doctor if you think you are having a problem with your medicine. · Find healthy ways to deal with stress. ? Exercise daily. ? Get plenty of sleep. ? Eat regularly and well. When should you call for help? Call 911 anytime you think you may need emergency care. For example, call if:    · You have severe shortness of breath.     · You have symptoms of a heart attack. These may include:  ? Chest pain or pressure, or a strange feeling in the chest.  ? Sweating. ? Shortness of breath. ? Nausea or vomiting.   ? Pain, pressure, or a strange feeling in the back, neck, jaw, or upper belly or in one or both shoulders or arms. ? Lightheadedness or sudden weakness. ? A fast or irregular heartbeat. After you call 911, the  may tell you to chew 1 adult-strength or 2 to 4 low-dose aspirin. Wait for an ambulance. Do not try to drive yourself. Call your doctor now or seek immediate medical care if:    · Your shortness of breath gets worse or you start to wheeze. Wheezing is a high-pitched sound when you breathe.     · You wake up at night out of breath or have to prop your head up on several pillows to breathe.     · You are short of breath after only light activity or while at rest.   Watch closely for changes in your health, and be sure to contact your doctor if:    · You do not get better over the next 1 to 2 days. Where can you learn more? Go to http://www.gray.com/  Enter S780 in the search box to learn more about \"Shortness of Breath: Care Instructions. \"  Current as of: February 24, 2020               Content Version: 12.6  © 3607-1387 [employer]. Care instructions adapted under license by [employer] (which disclaims liability or warranty for this information). If you have questions about a medical condition or this instruction, always ask your healthcare professional. [address] any warranty or liability for your use of this information. We hope that we have addressed all of your medical concerns. The examination and treatment you received in the Emergency Department were for an emergent problem and were not intended as complete care. It is important that you follow up with your healthcare provider(s) for ongoing care. If your symptoms worsen or do not improve as expected, and you are unable to reach your usual health care provider(s), you should return to the Emergency Department.       Today's healthcare is undergoing tremendous change, and patient satisfaction surveys are one of the many tools to assess the quality of medical care. You may receive a survey from the [employer] organization regarding your experience in the Emergency Department. I hope that your experience has been completely positive, particularly the medical care that I provided. As such, please participate in the survey; anything less than excellent does not meet my expectations or intentions. [facility] and [address] participate in nationally recognized quality of care measures. If your blood pressure is greater than 120/80, as reported below, we urge that you seek medical care to address the potential of high blood pressure, commonly known as hypertension. Hypertension can be hereditary or can be caused by certain medical conditions, pain, stress, or \"white coat syndrome. \"       Please make an appointment with your health care provider(s) for follow up of your Emergency Department visit. VITALS:   Patient Vitals for the past 8 hrs:   Temp Pulse Resp BP SpO2   12/10/20 0120 (!) 101.6 °F (38.7 °C) 87 16 (!) 193/91 94 %          Thank you for allowing us to provide you with medical care today. We realize that you have many choices for your emergency care needs. Please choose us in the future for any continued health care needs. Ollie Peacemaker McLeod, [address] [place] [address] 20.   Office: [phone]            Recent Results (from the past 24 hour(s))   EKG, 12 LEAD, INITIAL    Collection Time: 12/10/20  1:43 AM   Result Value Ref Range    Ventricular Rate 89 BPM    Atrial Rate 89 BPM    P-R Interval 142 ms    QRS Duration 82 ms    Q-T Interval 360 ms    QTC Calculation (Bezet) 438 ms    Calculated P Axis 43 degrees    Calculated T Axis 11 degrees    Diagnosis       Normal sinus rhythm  Inferior infarct , age undetermined  Abnormal ECG  When compared with ECG of 30-OCT-2017 12:42,  Vent.  rate has increased BY  33 BPM     CBC WITH AUTOMATED DIFF Collection Time: 12/10/20  1:45 AM   Result Value Ref Range    WBC 8.7 4.1 - 11.1 K/uL    RBC 5.25 4. 10 - 5.70 M/uL    HGB 14.8 12.1 - 17.0 g/dL    HCT 44.7 36.6 - 50.3 %    MCV 85.1 80.0 - 99.0 FL    MCH 28.2 26.0 - 34.0 PG    MCHC 33.1 30.0 - 36.5 g/dL    RDW 12.2 11.5 - 14.5 %    PLATELET 886 801 - 697 K/uL    MPV 9.9 8.9 - 12.9 FL    NRBC 0.0 0  WBC    ABSOLUTE NRBC 0.00 0.00 - 0.01 K/uL    NEUTROPHILS 71 32 - 75 %    BAND NEUTROPHILS 6 0 - 6 %    LYMPHOCYTES 6 (L) 12 - 49 %    MONOCYTES 17 (H) 5 - 13 %    EOSINOPHILS 0 0 - 7 %    BASOPHILS 0 0 - 1 %    IMMATURE GRANULOCYTES 0 %    ABS. NEUTROPHILS 6.7 1.8 - 8.0 K/UL    ABS. LYMPHOCYTES 0.5 (L) 0.8 - 3.5 K/UL    ABS. MONOCYTES 1.5 (H) 0.0 - 1.0 K/UL    ABS. EOSINOPHILS 0.0 0.0 - 0.4 K/UL    ABS. BASOPHILS 0.0 0.0 - 0.1 K/UL    ABS. IMM. GRANS. 0.0 K/UL    DF MANUAL      RBC COMMENTS NORMOCYTIC, NORMOCHROMIC      WBC COMMENTS PLASMACYTOID LYMPH     METABOLIC PANEL, COMPREHENSIVE    Collection Time: 12/10/20  1:45 AM   Result Value Ref Range    Sodium 130 (L) 136 - 145 mmol/L    Potassium 3.6 3.5 - 5.1 mmol/L    Chloride 99 97 - 108 mmol/L    CO2 24 21 - 32 mmol/L    Anion gap 7 5 - 15 mmol/L    Glucose 178 (H) 65 - 100 mg/dL    BUN 20 6 - 20 MG/DL    Creatinine 1.08 0.70 - 1.30 MG/DL    BUN/Creatinine ratio 19 12 - 20      GFR est AA >60 >60 ml/min/1.73m2    GFR est non-AA >60 >60 ml/min/1.73m2    Calcium 8.6 8.5 - 10.1 MG/DL    Bilirubin, total 0.5 0.2 - 1.0 MG/DL    ALT (SGPT) 76 12 - 78 U/L    AST (SGOT) 67 (H) 15 - 37 U/L    Alk.  phosphatase 72 45 - 117 U/L    Protein, total 7.9 6.4 - 8.2 g/dL    Albumin 2.5 (L) 3.5 - 5.0 g/dL    Globulin 5.4 (H) 2.0 - 4.0 g/dL    A-G Ratio 0.5 (L) 1.1 - 2.2     LIPASE    Collection Time: 12/10/20  1:45 AM   Result Value Ref Range    Lipase 77 73 - 393 U/L   MAGNESIUM    Collection Time: 12/10/20  1:45 AM   Result Value Ref Range    Magnesium 2.3 1.6 - 2.4 mg/dL   PTT    Collection Time: 12/10/20  1:45 AM   Result Value Ref Range    aPTT 25.2 22.1 - 31.0 sec    aPTT, therapeutic range     58.0 - 77.0 SECS   PROTHROMBIN TIME + INR    Collection Time: 12/10/20  1:45 AM   Result Value Ref Range    INR 1.1 0.9 - 1.1      Prothrombin time 11.5 (H) 9.0 - 11.1 sec   D DIMER    Collection Time: 12/10/20  1:45 AM   Result Value Ref Range    D-dimer 2.01 (H) 0.00 - 0.65 mg/L FEU   TROPONIN I    Collection Time: 12/10/20  1:45 AM   Result Value Ref Range    Troponin-I, Qt. <0.05 <0.05 ng/mL   PROCALCITONIN    Collection Time: 12/10/20  1:45 AM   Result Value Ref Range    Procalcitonin 0.37 ng/mL   LACTIC ACID    Collection Time: 12/10/20  1:45 AM   Result Value Ref Range    Lactic acid 1.7 0.4 - 2.0 MMOL/L   FIBRINOGEN    Collection Time: 12/10/20  1:45 AM   Result Value Ref Range    Fibrinogen >800 (H) 200 - 475 mg/dL   SAMPLES BEING HELD    Collection Time: 12/10/20  1:45 AM   Result Value Ref Range    SAMPLES BEING HELD 1SST,1DRK GRN     COMMENT        Add-on orders for these samples will be processed based on acceptable specimen integrity and analyte stability, which may vary by analyte. Cta Chest W Or W Wo Cont    Result Date: 12/10/2020  Clinical indication: Shortness of breath, hypoxia. Localizer obtained without contrast at the level of the pulmonary arteries. Fast injection rate of 80 cc of Isovue-370 with review of the raw data and MIP reconstructions. No prior. CT dose reduction was achieved through the use of a standardized protocol tailored for this examination and automatic exposure control for dose modulation . [place] There is no pulmonary emboli. There is no pericardial or pleural effusion. No shift or pneumothorax. Diffuse bilateral patchy infiltrates compatible with atypical pneumonia. No masses or adenopathy. Next     IMPRESSION: No pulmonary emboli. Patchy infiltrates bilaterally. Xr Chest Port    Result Date: 12/10/2020  Clinical indication: Covid. Portable AP upright view of the chest obtained. No prior. Inspiration is shallow with mild bibasal atelectasis. The heart size is normal. No consolidation or shift. Next     IMPRESSION: Shallow inspiration with mild bibasilar platelike atelectasis.

## 2020-12-11 ENCOUNTER — PATIENT OUTREACH (OUTPATIENT)
Dept: CASE MANAGEMENT | Age: 60
End: 2020-12-11

## 2022-03-18 PROBLEM — L72.0 EPIDERMAL INCLUSION CYST: Status: ACTIVE | Noted: 2017-10-20

## 2023-05-25 RX ORDER — OXYCODONE HYDROCHLORIDE AND ACETAMINOPHEN 5; 325 MG/1; MG/1
1 TABLET ORAL EVERY 4 HOURS PRN
COMMUNITY
Start: 2017-10-30

## 2023-05-25 RX ORDER — ALBUTEROL SULFATE 90 UG/1
2 AEROSOL, METERED RESPIRATORY (INHALATION) EVERY 4 HOURS PRN
COMMUNITY
Start: 2020-12-10

## 2025-03-12 ENCOUNTER — HOSPITAL ENCOUNTER (OUTPATIENT)
Age: 65
Discharge: HOME | End: 2025-03-15
Payer: MEDICARE

## 2025-03-12 DIAGNOSIS — R06.2 WHEEZING: ICD-10-CM

## 2025-03-12 PROCEDURE — 71046 X-RAY EXAM CHEST 2 VIEWS: CPT

## (undated) DEVICE — STERILE POLYISOPRENE POWDER-FREE SURGICAL GLOVES: Brand: PROTEXIS

## (undated) DEVICE — SUTURE VCRL SZ 3-0 L27IN ABSRB UD L26MM SH 1/2 CIR J416H

## (undated) DEVICE — TELFA NON-ADHERENT ABSORBENT DRESSING: Brand: TELFA

## (undated) DEVICE — (D)PREP SKN CHLRAPRP APPL 26ML -- CONVERT TO ITEM 371833

## (undated) DEVICE — INFECTION CONTROL KIT SYS

## (undated) DEVICE — SOL IRRIGATION INJ NACL 0.9% 500ML BTL

## (undated) DEVICE — KENDALL SCD EXPRESS SLEEVES, KNEE LENGTH, MEDIUM: Brand: KENDALL SCD

## (undated) DEVICE — DEVON™ KNEE AND BODY STRAP 60" X 3" (1.5 M X 7.6 CM): Brand: DEVON

## (undated) DEVICE — SUTURE ETHLN SZ 4-0 L18IN NONABSORBABLE BLK L19MM PS-2 3/8 1667H

## (undated) DEVICE — DEVICE TRNSF SPIK STL 2008S] MICROTEK MEDICAL INC]

## (undated) DEVICE — ROCKER SWITCH PENCIL BLADE ELECTRODE, HOLSTER: Brand: EDGE

## (undated) DEVICE — COVER LT HNDL BLU PLAS

## (undated) DEVICE — LIGHT HANDLE: Brand: DEVON

## (undated) DEVICE — CHEST PACK: Brand: MEDLINE INDUSTRIES, INC.

## (undated) DEVICE — SUTURE MCRYL SZ 4-0 L27IN ABSRB UD L19MM PS-2 1/2 CIR PRIM Y426H

## (undated) DEVICE — NEEDLE HYPO 22GA L1.5IN BLK S STL HUB POLYPR SHLD REG BVL